# Patient Record
Sex: MALE | Race: WHITE | NOT HISPANIC OR LATINO | Employment: STUDENT | URBAN - METROPOLITAN AREA
[De-identification: names, ages, dates, MRNs, and addresses within clinical notes are randomized per-mention and may not be internally consistent; named-entity substitution may affect disease eponyms.]

---

## 2022-04-22 ENCOUNTER — TELEPHONE (OUTPATIENT)
Dept: CARDIOLOGY | Facility: MEDICAL CENTER | Age: 22
End: 2022-04-22

## 2022-04-22 NOTE — TELEPHONE ENCOUNTER
----- Message from Elena Adkins R.N. sent at 4/22/2022 11:04 AM PDT -----  Regarding: FW: New Patient appt  Please call to schedule as new patient with JM. Thank you!  ----- Message -----  From: Humberto Doan M.D.  Sent: 4/21/2022   5:27 PM PDT  To: Elena Adkins R.N.  Subject: RE: New Patient appt                             Yes happy to make an exception. Thanks for the letting me know!  -Humberto      ----- Message -----  From: Elena Adkins R.N.  Sent: 4/21/2022  12:12 PM PDT  To: Humberto Doan M.D.  Subject: FW: New Patient appt                             Ok to schedule with you? Thank you!  ----- Message -----  From: Laura Castro, Med Ass't  Sent: 4/21/2022   8:59 AM PDT  To: Elena Adkins R.N.  Subject: New Patient appt                                 Ze Lantigua,    I spoke to Pts mother Armida Christianson this morning and Pt had a cardiac arrest in [place] and has been in the hospital for 3 weeks and is being discharged tomorrow. They will be flying back to [facility] on Tuesday and Armida wants to schedule her son with Dr. Doan but he is not accepting new patients. Armida states Dr. Evangelista Arredondo at [facility] told Armida to tell Dr. Doan that he sent her.     Not sure if Dr. Doan will make an exception but I told her I would ask.    Thank you

## 2022-04-26 ENCOUNTER — TELEPHONE (OUTPATIENT)
Dept: CARDIOLOGY | Facility: MEDICAL CENTER | Age: 22
End: 2022-04-26

## 2022-04-26 NOTE — TELEPHONE ENCOUNTER
Call placed to patient's mom  Listed on patients contacts in regards to upcoming NP appt with BLAYNE. Mom stated they were in [place] when he went into cardiac arrest has several testing and blood work done at [facility] in [place] where he was admitted for 3 weeks. Mom stated she has copies of all records incase we are unable to request them.

## 2022-05-02 NOTE — TELEPHONE ENCOUNTER
Unable to request records. Spoke to patients mom Landy regarding medical records patient stated she will have someone bring them in before appt or she will try to fax them.       Pending records.

## 2022-05-04 ENCOUNTER — OFFICE VISIT (OUTPATIENT)
Dept: CARDIOLOGY | Facility: MEDICAL CENTER | Age: 22
End: 2022-05-04
Payer: COMMERCIAL

## 2022-05-04 VITALS
HEART RATE: 67 BPM | BODY MASS INDEX: 20.01 KG/M2 | OXYGEN SATURATION: 97 % | SYSTOLIC BLOOD PRESSURE: 116 MMHG | RESPIRATION RATE: 14 BRPM | WEIGHT: 151 LBS | HEIGHT: 73 IN | DIASTOLIC BLOOD PRESSURE: 76 MMHG

## 2022-05-04 DIAGNOSIS — Z78.9 ALCOHOL USE: ICD-10-CM

## 2022-05-04 DIAGNOSIS — I46.2 CARDIAC ARREST DUE TO UNDERLYING CARDIAC CONDITION (HCC): ICD-10-CM

## 2022-05-04 DIAGNOSIS — R55 SYNCOPE AND COLLAPSE: ICD-10-CM

## 2022-05-04 LAB — EKG IMPRESSION: NORMAL

## 2022-05-04 PROCEDURE — 99205 OFFICE O/P NEW HI 60 MIN: CPT | Performed by: INTERNAL MEDICINE

## 2022-05-04 PROCEDURE — 93000 ELECTROCARDIOGRAM COMPLETE: CPT | Performed by: INTERNAL MEDICINE
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
## 2022-05-13 PROBLEM — R55 SYNCOPE AND COLLAPSE: Status: ACTIVE | Noted: 2022-05-13

## 2022-05-13 PROBLEM — I46.2 CARDIAC ARREST DUE TO UNDERLYING CARDIAC CONDITION (HCC): Status: ACTIVE | Noted: 2022-05-13

## 2022-05-13 PROBLEM — Z78.9 ALCOHOL USE: Status: ACTIVE | Noted: 2022-05-13
